# Patient Record
Sex: FEMALE | Race: ASIAN | NOT HISPANIC OR LATINO | URBAN - METROPOLITAN AREA
[De-identification: names, ages, dates, MRNs, and addresses within clinical notes are randomized per-mention and may not be internally consistent; named-entity substitution may affect disease eponyms.]

---

## 2018-07-20 ENCOUNTER — EMERGENCY (EMERGENCY)
Facility: HOSPITAL | Age: 38
LOS: 1 days | Discharge: HOME | End: 2018-07-20
Attending: EMERGENCY MEDICINE | Admitting: EMERGENCY MEDICINE
Payer: MEDICAID

## 2018-07-20 VITALS
RESPIRATION RATE: 15 BRPM | OXYGEN SATURATION: 99 % | HEART RATE: 89 BPM | SYSTOLIC BLOOD PRESSURE: 118 MMHG | DIASTOLIC BLOOD PRESSURE: 81 MMHG

## 2018-07-20 VITALS
TEMPERATURE: 98 F | HEART RATE: 106 BPM | SYSTOLIC BLOOD PRESSURE: 136 MMHG | DIASTOLIC BLOOD PRESSURE: 85 MMHG | RESPIRATION RATE: 15 BRPM | OXYGEN SATURATION: 98 %

## 2018-07-20 PROCEDURE — 99283 EMERGENCY DEPT VISIT LOW MDM: CPT | Mod: 25

## 2018-07-20 RX ORDER — ACETAMINOPHEN 500 MG
650 TABLET ORAL ONCE
Qty: 0 | Refills: 0 | Status: COMPLETED | OUTPATIENT
Start: 2018-07-20 | End: 2018-07-20

## 2018-07-20 RX ORDER — IBUPROFEN 200 MG
400 TABLET ORAL ONCE
Qty: 0 | Refills: 0 | Status: DISCONTINUED | OUTPATIENT
Start: 2018-07-20 | End: 2018-07-20

## 2018-07-20 RX ORDER — TETANUS TOXOID, REDUCED DIPHTHERIA TOXOID AND ACELLULAR PERTUSSIS VACCINE, ADSORBED 5; 2.5; 8; 8; 2.5 [IU]/.5ML; [IU]/.5ML; UG/.5ML; UG/.5ML; UG/.5ML
0.5 SUSPENSION INTRAMUSCULAR ONCE
Qty: 0 | Refills: 0 | Status: COMPLETED | OUTPATIENT
Start: 2018-07-20 | End: 2018-07-20

## 2018-07-20 RX ORDER — IBUPROFEN 200 MG
1 TABLET ORAL
Qty: 20 | Refills: 0 | OUTPATIENT
Start: 2018-07-20 | End: 2018-07-24

## 2018-07-20 RX ADMIN — Medication 1 TABLET(S): at 05:07

## 2018-07-20 RX ADMIN — TETANUS TOXOID, REDUCED DIPHTHERIA TOXOID AND ACELLULAR PERTUSSIS VACCINE, ADSORBED 0.5 MILLILITER(S): 5; 2.5; 8; 8; 2.5 SUSPENSION INTRAMUSCULAR at 05:07

## 2018-07-20 RX ADMIN — Medication 650 MILLIGRAM(S): at 05:07

## 2018-07-20 NOTE — ED PROVIDER NOTE - OBJECTIVE STATEMENT
38 yo F, accompanied by mother, w/ no known PMH and on no regular medications, presenting to [facility] ED one hour s/p dog bite by pit bull outside of house. The pit bull belongs to a neighbor, and the patient states that they asked the neighbor and the dog is vaccinated and can be observed for 10 days. The patient sustained a bite wound to the R elbow, as well as numerous scratches on the L arm. Patient has no other complaints. Denies headache, neck stiffness, fever/chills, vision change, chest pain, shortness of breath, difficulty breathing, palpitations, lightheadedness, weakness, dizziness, numbness, tingling, abdominal pain, nausea, vomiting, diarrhea, dysuria, hematuria, syncope.

## 2018-07-20 NOTE — ED ADULT NURSE NOTE - OBJECTIVE STATEMENT
Received pt in room 26, ambulatory, pt A&Ox4, respirations even and unlabored b/l. No bleeding noted from dog bite site on right elbow. NAD. Awaiting MD win. Will continue to monitor.

## 2018-07-20 NOTE — ED PROVIDER NOTE - MEDICAL DECISION MAKING DETAILS
36 yo F w/ no known PMH presenting s/p dog bite 1 hour ago by dog belonging to one of patient's neighbors, reported vaccinations up to date. Patient states dog can be observed for 10 days. Has bite wound on R arm. Will treat w/ antibiotics, repair laceration, and instruct patient to return if dog displays any unusual behavior or for any other concerns. 36 yo F w/ no known PMH presenting s/p dog bite 1 hour ago by dog belonging to one of patient's neighbors, reported vaccinations up to date. Patient states dog can be observed for 10 days. Has bite wound on R arm. Will treat w/ antibiotics, adacel, and instruct patient to return if dog displays any unusual behavior or for any other concerns.

## 2018-07-20 NOTE — ED ADULT NURSE NOTE - CHIEF COMPLAINT
The patient is a 37y Female complaining of punctured dog bite on right elbow, reports it was neighbor's dog.

## 2018-07-20 NOTE — ED PROVIDER NOTE - ATTENDING CONTRIBUTION TO CARE
DR. TRIMBLE, ATTENDING MD-  I performed a face to face bedside interview with patient regarding history of present illness, review of symptoms and past medical history. I completed an independent physical exam.  I have discussed patient's plan of care with the resident.   Documentation as above in the note.   HPI: 38 yo F, accompanied by mother, w/ no known PMH and on no regular medications, presenting to [facility] ED one hour s/p dog bite by pit bull outside of house. The pit bull belongs to a neighbor, and the patient states that they asked the neighbor and the dog is vaccinated and can be observed for 10 days and they are able. The patient sustained a bite wound to the R elbow, as well as numerous scratches on the L arm when she was trying to lift her small dog out of [facility] which then ended up biting her on the right elbow. It was not unprovoked, dog was not foaming at mouth, was calm afterwards. Patient has no other complaints. Denies headache, neck stiffness, fever/chills, vision change, chest pain, shortness of breath, difficulty breathing, palpitations, lightheadedness, weakness, dizziness, numbness, tingling, abdominal pain, nausea, vomiting, diarrhea, dysuria, hematuria, syncope.  EXAM: Well appearing, NAD, right posterior elbow with open 2 cm lac no FB, no drainage, +dried blood. FROM at elbow.   MDM: Concern for dog bite but unlikely Rabies. Spoke extensively to pt regarding rabies prophylaxis and she refused, says she can monitor dog and make sure no abnormalities. Will provide tdap, augmentin and send to pharmacy for augmentin for prophlyaxis against bacterial infection. Pain meds PRN. Rec to wash with soap and water, dress with gauze but no primary suture closure required today. Rec to come back to ED in 2 days for wound check. Also needs PMD f/u, list of referrals given to pt. Return for any worsening symptoms including symptoms consistent with rabies. Pt and mom aware.

## 2018-07-20 NOTE — ED PROVIDER NOTE - SKIN, MLM
Skin normal color for race, warm, dry. 3 cm bite wound superior to posterior aspect of R elbow. Numerous superficial scratches on the L arm.

## 2018-07-20 NOTE — ED ADULT TRIAGE NOTE - CHIEF COMPLAINT QUOTE
pt s/p dog bite at right elbow. presents with area bandaged, no active bleeding apparent. as per ems, puncture wounds were noted. pt states it was neighbors dog

## 2018-07-22 ENCOUNTER — EMERGENCY (EMERGENCY)
Facility: HOSPITAL | Age: 38
LOS: 1 days | Discharge: HOME | End: 2018-07-22
Admitting: EMERGENCY MEDICINE
Payer: MEDICAID

## 2018-07-22 VITALS
HEART RATE: 73 BPM | SYSTOLIC BLOOD PRESSURE: 103 MMHG | DIASTOLIC BLOOD PRESSURE: 70 MMHG | OXYGEN SATURATION: 100 % | RESPIRATION RATE: 16 BRPM | TEMPERATURE: 98 F

## 2018-07-22 PROCEDURE — 99282 EMERGENCY DEPT VISIT SF MDM: CPT

## 2018-07-22 NOTE — ED ADULT TRIAGE NOTE - CHIEF COMPLAINT QUOTE
alert no distress here for wound check   was treated at [facility] for dog bite friday    states wound is still painful and bleeding

## 2018-07-22 NOTE — ED PROVIDER NOTE - OBJECTIVE STATEMENT
36 y/o F w/ no PMHx p/w wound check to rt posterior elbow. x2days ago pt bit on rt elbow by neighbor's dog, sustained multiple puncture wounds to area. Given tetanus vac irrigated wound left open, d/c'd Augmentin. Pain and redness is slightly better but still complaining of soreness w/ ROM. Denies new redness, swelling, purulent discharge, bleeding. Neighbor's dog UTD on all vaccinations. 38 y/o F w/ no PMHx p/w wound check to rt posterior elbow. x2days ago pt bit on the rt elbow by neighbor's dog, sustained multiple puncture wounds to the area. Given tetanus vaccine. Wound was irrigated and left open, d/c'd on Augmentin. Pain and redness is slightly better but still complaining of soreness w/ ROM. Denies new redness, swelling, purulent discharge, bleeding. Neighbor's dog UTD on all vaccinations.

## 2018-07-22 NOTE — ED PROVIDER NOTE - SKIN WOUND DESCRIPTION
rt posterior elbow +multiple small puncture wounds, x1 large 1.0cmx1.0cm shallow puncture wound/ulceration w/ slight surrounding erythema no induration, no fluctuance, no active bleeding, no purulent drainage, FROM elbow, mild ecchymosis noted inferiorly to wound

## 2018-07-22 NOTE — ED PROVIDER NOTE - CHPI ED SYMPTOMS NEG
no bleeding/no bleeding at site/Denies new redness, swelling, purulent discharge, bleeding/no purulent drainage

## 2019-05-16 ENCOUNTER — APPOINTMENT (OUTPATIENT)
Dept: OPHTHALMOLOGY | Facility: CLINIC | Age: 39
End: 2019-05-16
Payer: MEDICAID

## 2019-05-16 ENCOUNTER — NON-APPOINTMENT (OUTPATIENT)
Age: 39
End: 2019-05-16

## 2019-05-16 PROBLEM — Z00.00 ENCOUNTER FOR PREVENTIVE HEALTH EXAMINATION: Status: ACTIVE | Noted: 2019-05-16

## 2019-05-16 PROCEDURE — 92004 COMPRE OPH EXAM NEW PT 1/>: CPT

## 2019-05-23 ENCOUNTER — APPOINTMENT (OUTPATIENT)
Dept: OPHTHALMOLOGY | Facility: CLINIC | Age: 39
End: 2019-05-23
Payer: MEDICAID

## 2019-05-23 ENCOUNTER — NON-APPOINTMENT (OUTPATIENT)
Age: 39
End: 2019-05-23

## 2019-05-23 PROCEDURE — 92014 COMPRE OPH EXAM EST PT 1/>: CPT

## 2019-06-04 ENCOUNTER — NON-APPOINTMENT (OUTPATIENT)
Age: 39
End: 2019-06-04

## 2019-06-04 ENCOUNTER — APPOINTMENT (OUTPATIENT)
Dept: OPHTHALMOLOGY | Facility: CLINIC | Age: 39
End: 2019-06-04
Payer: MEDICAID

## 2019-06-04 PROCEDURE — 92134 CPTRZ OPH DX IMG PST SGM RTA: CPT

## 2019-06-04 PROCEDURE — 92014 COMPRE OPH EXAM EST PT 1/>: CPT

## 2019-06-13 ENCOUNTER — NON-APPOINTMENT (OUTPATIENT)
Age: 39
End: 2019-06-13

## 2019-06-13 ENCOUNTER — APPOINTMENT (OUTPATIENT)
Dept: OPHTHALMOLOGY | Facility: CLINIC | Age: 39
End: 2019-06-13
Payer: MEDICAID

## 2019-06-13 PROCEDURE — 92012 INTRM OPH EXAM EST PATIENT: CPT

## 2019-06-27 ENCOUNTER — APPOINTMENT (OUTPATIENT)
Dept: OPHTHALMOLOGY | Facility: CLINIC | Age: 39
End: 2019-06-27

## 2020-02-06 NOTE — ED PROVIDER NOTE - TOBACCO USE
Electrodesiccation Text: The wound bed was treated with electrodesiccation after the biopsy was performed. Unknown if ever smoked

## 2020-12-23 NOTE — ED ADULT TRIAGE NOTE - ARRIVAL FROM
Quality 130: Documentation Of Current Medications In The Medical Record: Current Medications Documented Detail Level: Detailed Home
